# Patient Record
Sex: MALE | Race: WHITE | URBAN - METROPOLITAN AREA
[De-identification: names, ages, dates, MRNs, and addresses within clinical notes are randomized per-mention and may not be internally consistent; named-entity substitution may affect disease eponyms.]

---

## 2022-08-16 ENCOUNTER — OFFICE VISIT (OUTPATIENT)
Dept: URBAN - METROPOLITAN AREA CLINIC 33 | Facility: CLINIC | Age: 37
End: 2022-08-16
Payer: COMMERCIAL

## 2022-08-16 DIAGNOSIS — H01.021 SQUAMOUS BLEPHARITIS RIGHT UPPER EYELID: Primary | ICD-10-CM

## 2022-08-16 DIAGNOSIS — H01.024 SQUAMOUS BLEPHARITIS LEFT UPPER EYELID: ICD-10-CM

## 2022-08-16 PROCEDURE — 99203 OFFICE O/P NEW LOW 30 MIN: CPT | Performed by: OPTOMETRIST

## 2022-08-16 RX ORDER — DOXYCYCLINE 50 MG/1
50 MG CAPSULE ORAL
Qty: 56 | Refills: 0 | Status: ACTIVE
Start: 2022-08-16

## 2022-08-16 RX ORDER — NEOMYCIN SULFATE, POLYMYXIN B SULFATE AND DEXAMETHASONE 3.5; 10000; 1 MG/G; [USP'U]/G; MG/G
OINTMENT OPHTHALMIC
Qty: 3.5 | Refills: 3 | Status: ACTIVE
Start: 2022-08-16

## 2022-08-16 ASSESSMENT — INTRAOCULAR PRESSURE
OS: 19
OD: 19

## 2022-08-16 NOTE — IMPRESSION/PLAN
Impression: Squamous blepharitis right upper eyelid: H01.021. Plan: Patient diagnosed and treated for Blepharitis. Patient has collarettes at base of eyelashes, MGD, and telangiectasia OU. Patient reports some improvement with use of previous treatment of Prednisolone. Discussed chronic nature of condition and importance of the followin) Hot compress and eyelid massage. 2) Eyelid scrubs (consider tea tree oil scrubs) 3) Maxitrol Jenn. QHS OU
4) Doxycycline 100mg PO BID x 1 week, then 50mg PO QD x 4 weeks.

## 2022-09-27 ENCOUNTER — OFFICE VISIT (OUTPATIENT)
Dept: URBAN - METROPOLITAN AREA CLINIC 40 | Facility: CLINIC | Age: 37
End: 2022-09-27
Payer: COMMERCIAL

## 2022-09-27 DIAGNOSIS — H01.021 SQUAMOUS BLEPHARITIS RIGHT UPPER EYELID: Primary | ICD-10-CM

## 2022-09-27 PROCEDURE — 99213 OFFICE O/P EST LOW 20 MIN: CPT | Performed by: OPTOMETRIST

## 2022-09-27 NOTE — IMPRESSION/PLAN
Impression: Squamous blepharitis right upper eyelid: H01.021. Plan: Discussed diagnosis in detail with patient. Will continue to observe condition and or symptoms. Lid scrubs and hygiene were explained. Patient instructed to apply warm compresses. D/C meds.